# Patient Record
Sex: FEMALE | Race: ASIAN | NOT HISPANIC OR LATINO | URBAN - METROPOLITAN AREA
[De-identification: names, ages, dates, MRNs, and addresses within clinical notes are randomized per-mention and may not be internally consistent; named-entity substitution may affect disease eponyms.]

---

## 2019-06-26 ENCOUNTER — APPOINTMENT (RX ONLY)
Dept: URBAN - METROPOLITAN AREA CLINIC 34 | Facility: CLINIC | Age: 34
Setting detail: DERMATOLOGY
End: 2019-06-26

## 2019-06-26 VITALS
SYSTOLIC BLOOD PRESSURE: 99 MMHG | HEART RATE: 79 BPM | HEIGHT: 63 IN | WEIGHT: 103 LBS | DIASTOLIC BLOOD PRESSURE: 70 MMHG

## 2019-06-26 DIAGNOSIS — L21.8 OTHER SEBORRHEIC DERMATITIS: ICD-10-CM

## 2019-06-26 DIAGNOSIS — L85.3 XEROSIS CUTIS: ICD-10-CM

## 2019-06-26 PROCEDURE — ? COUNSELING

## 2019-06-26 PROCEDURE — 99202 OFFICE O/P NEW SF 15 MIN: CPT

## 2019-06-26 PROCEDURE — ? PLAN FOR BMI MANAGEMENT

## 2019-06-26 ASSESSMENT — LOCATION DETAILED DESCRIPTION DERM
LOCATION DETAILED: MID-OCCIPITAL SCALP
LOCATION DETAILED: LEFT INFERIOR VERMILION LIP
LOCATION DETAILED: RIGHT SUPERIOR VERMILION LIP

## 2019-06-26 ASSESSMENT — LOCATION ZONE DERM
LOCATION ZONE: SCALP
LOCATION ZONE: LIP

## 2019-06-26 ASSESSMENT — LOCATION SIMPLE DESCRIPTION DERM
LOCATION SIMPLE: LEFT LIP
LOCATION SIMPLE: POSTERIOR SCALP
LOCATION SIMPLE: RIGHT LIP

## 2019-06-26 NOTE — PROCEDURE: COUNSELING
Patient Specific Counseling (Will Not Stick From Patient To Patient): She has Nizoral at home - suggest she alternate the other shampoo's and use the Nizoral 2 - 3 x week in place of.  Put it in the rotation.  If problematic before follow up return to clinic.
Detail Level: Detailed
Patient Specific Counseling (Will Not Stick From Patient To Patient): She notes she has had problems on the lips for years- she use to pick at them - but has stopped. She states that she wonders if she has to exfoliate.  Advised today they look pretty good.  Recommend she avoid any trauma- use VaniCare line only and samples of VaniPly provided. Generously use throughout the day- and especially at night.  \\n\\nShe is to call if they flare- may be contact dermatitis - though she notes only using Aquaphor.  We can re-evaluate if flared . Otherwise follow up in 3 months.\\n\\nReminded no biting, licking or picking - which she notes she doesn't do and not witnessed at visit today.
Detail Level: Simple

## 2019-06-26 NOTE — HPI: RASH (DANDRUFF)
How Severe Is It?: mild
Is This A New Presentation, Or A Follow-Up?: Dandruff
Additional History: She notes improved after she started with Nizoral.